# Patient Record
Sex: FEMALE | Race: WHITE | Employment: FULL TIME | URBAN - METROPOLITAN AREA
[De-identification: names, ages, dates, MRNs, and addresses within clinical notes are randomized per-mention and may not be internally consistent; named-entity substitution may affect disease eponyms.]

---

## 2019-11-22 LAB — PAP SMEAR, EXTERNAL: NORMAL

## 2020-01-08 LAB — COLONOSCOPY, EXTERNAL: NORMAL

## 2020-10-11 VITALS
OXYGEN SATURATION: 98 % | SYSTOLIC BLOOD PRESSURE: 112 MMHG | WEIGHT: 209 LBS | HEIGHT: 66 IN | DIASTOLIC BLOOD PRESSURE: 70 MMHG | TEMPERATURE: 98.8 F | RESPIRATION RATE: 16 BRPM | BODY MASS INDEX: 33.59 KG/M2 | HEART RATE: 73 BPM

## 2020-10-11 RX ORDER — VENLAFAXINE HYDROCHLORIDE 75 MG/1
TABLET, EXTENDED RELEASE ORAL DAILY
COMMUNITY
End: 2021-03-29 | Stop reason: ALTCHOICE

## 2020-10-11 RX ORDER — BUDESONIDE AND FORMOTEROL FUMARATE DIHYDRATE 80; 4.5 UG/1; UG/1
2 AEROSOL RESPIRATORY (INHALATION)
COMMUNITY
End: 2021-03-29

## 2021-03-29 ENCOUNTER — OFFICE VISIT (OUTPATIENT)
Dept: PRIMARY CARE CLINIC | Age: 61
End: 2021-03-29
Payer: COMMERCIAL

## 2021-03-29 ENCOUNTER — IMMUNIZATION (OUTPATIENT)
Dept: PRIMARY CARE CLINIC | Age: 61
End: 2021-03-29

## 2021-03-29 ENCOUNTER — TELEPHONE (OUTPATIENT)
Dept: PRIMARY CARE CLINIC | Age: 61
End: 2021-03-29

## 2021-03-29 VITALS
OXYGEN SATURATION: 99 % | TEMPERATURE: 97.9 F | RESPIRATION RATE: 16 BRPM | HEART RATE: 61 BPM | BODY MASS INDEX: 32.47 KG/M2 | HEIGHT: 66 IN | SYSTOLIC BLOOD PRESSURE: 153 MMHG | DIASTOLIC BLOOD PRESSURE: 80 MMHG | WEIGHT: 202 LBS

## 2021-03-29 DIAGNOSIS — Z00.00 ANNUAL PHYSICAL EXAM: Primary | ICD-10-CM

## 2021-03-29 DIAGNOSIS — Z13.220 LIPID SCREENING: ICD-10-CM

## 2021-03-29 DIAGNOSIS — Z11.59 ENCOUNTER FOR HEPATITIS C SCREENING TEST FOR LOW RISK PATIENT: ICD-10-CM

## 2021-03-29 DIAGNOSIS — I10 ESSENTIAL HYPERTENSION: Chronic | ICD-10-CM

## 2021-03-29 DIAGNOSIS — Z12.31 ENCOUNTER FOR SCREENING MAMMOGRAM FOR MALIGNANT NEOPLASM OF BREAST: ICD-10-CM

## 2021-03-29 DIAGNOSIS — F33.1 MODERATE EPISODE OF RECURRENT MAJOR DEPRESSIVE DISORDER (HCC): Chronic | ICD-10-CM

## 2021-03-29 DIAGNOSIS — Z13.31 POSITIVE DEPRESSION SCREENING: ICD-10-CM

## 2021-03-29 PROCEDURE — 99396 PREV VISIT EST AGE 40-64: CPT | Performed by: NURSE PRACTITIONER

## 2021-03-29 PROCEDURE — 99214 OFFICE O/P EST MOD 30 MIN: CPT | Performed by: NURSE PRACTITIONER

## 2021-03-29 RX ORDER — VALSARTAN 80 MG/1
80 TABLET ORAL DAILY
Qty: 90 TAB | Refills: 1 | Status: SHIPPED | OUTPATIENT
Start: 2021-03-29 | End: 2021-04-22 | Stop reason: SDUPTHER

## 2021-03-29 RX ORDER — DULOXETIN HYDROCHLORIDE 30 MG/1
60 CAPSULE, DELAYED RELEASE ORAL DAILY
Qty: 180 CAP | Refills: 1 | Status: SHIPPED | OUTPATIENT
Start: 2021-03-29 | End: 2021-04-22 | Stop reason: SDUPTHER

## 2021-03-29 NOTE — PROGRESS NOTES
HISTORY OF PRESENT ILLNESS  Ran Olson is a 57 y.o. female presents for   Chief Complaint   Patient presents with    Follow-up     Physical; generally good wants to be checked over and get labs    Depression was on depression meds but took herself off because she \"felt\" better  Also has left sided pain/numbness down to foot left side. COVID shots 1/27/2021 and 2/17/2021    Vitals:    03/29/21 0908   BP: (!) 153/80   BP 1 Location: Right arm   BP Patient Position: Sitting   Pulse: 61   Resp: 16   Temp: 97.9 °F (36.6 °C)   TempSrc: Temporal   SpO2: 99%   Weight: 202 lb (91.6 kg)   Height: 5' 6\" (1.676 m)      There is no problem list on file for this patient. There are no active problems to display for this patient. Allergies   Allergen Reactions    Oxycodone-Acetaminophen Itching     History reviewed. No pertinent past medical history. Past Surgical History:   Procedure Laterality Date    HX BILATERAL SALPINGECTOMY  1985    HX KNEE ARTHROSCOPY      HX TUBAL LIGATION Bilateral      Family History   Problem Relation Age of Onset    Arthritis-osteo Mother     Lung Disease Mother     Heart Disease Mother     Cancer Mother         colorectal    Diabetes Father     Stroke Father     Asthma Sister     Cancer Sister         breast     Social History     Tobacco Use    Smoking status: Never Smoker    Smokeless tobacco: Never Used   Substance Use Topics    Alcohol use: Not Currently           Review of Systems   Constitutional: Negative for fever and weight loss. HENT: Negative for sore throat and tinnitus. Eyes: Negative for blurred vision and double vision. Respiratory: Negative for shortness of breath. Cardiovascular: Negative for chest pain, palpitations and leg swelling. Gastrointestinal: Negative for constipation and diarrhea. Skin: Negative for itching and rash. Neurological: Negative for dizziness. Endo/Heme/Allergies: Does not bruise/bleed easily. Psychiatric/Behavioral: Positive for depression. The patient is nervous/anxious. The patient does not have insomnia. Physical Exam  Vitals signs reviewed. Constitutional:       Appearance: Normal appearance. She is overweight. HENT:      Head: Normocephalic. Nose: Nose normal.      Mouth/Throat:      Mouth: Mucous membranes are moist.   Eyes:      Extraocular Movements: Extraocular movements intact. Pupils: Pupils are equal, round, and reactive to light. Neck:      Musculoskeletal: Normal range of motion and neck supple. Cardiovascular:      Rate and Rhythm: Normal rate and regular rhythm. Pulses: Normal pulses. Heart sounds: Normal heart sounds. Pulmonary:      Effort: Pulmonary effort is normal.      Breath sounds: Normal breath sounds. Musculoskeletal: Normal range of motion. Skin:     General: Skin is warm and dry. Neurological:      General: No focal deficit present. Mental Status: She is alert and oriented to person, place, and time. Psychiatric:         Attention and Perception: Attention and perception normal.         Mood and Affect: Affect normal.         Speech: Speech normal.         Behavior: Behavior normal. Behavior is cooperative. Thought Content: Thought content normal.         Cognition and Memory: Cognition and memory normal.         Judgment: Judgment normal.           ASSESSMENT and PLAN  Diagnoses and all orders for this visit:    1. Annual physical exam  Comments:  generally healthy  Orders:  -     [facility] MAM[facility] BI SCREENING INCL CAD; Future  -     CBC WITH AUTOMATED DIFF  -     METABOLIC PANEL, COMPREHENSIVE  -     HEMOGLOBIN A1C WITH EAG  -     LIPID PANEL  -     HEPATITIS C AB    2. Encounter for hepatitis C screening test for low risk patient  Comments:  labs  Orders:  -     HEMOGLOBIN A1C WITH EAG    3.  Lipid screening  Comments:  labs  Orders:  -     LIPID PANEL    4. Encounter for screening mammogram for malignant neoplasm of breast  Comments:  yearly screen  Orders:  -     [facility] MAMMO BI SCREENING INCL CAD; Future    5. Moderate episode of recurrent major depressive disorder (HCC)  Comments:  returning to depression meds, also has left sided sciatica. Melissa  burning in feet  Orders:  -     DULoxetine (CYMBALTA) 30 mg capsule; Take 2 Caps by mouth daily. 6. Positive depression screening  Comments:  cymbalta  Orders:  -     DULoxetine (CYMBALTA) 30 mg capsule; Take 2 Caps by mouth daily. 7. Essential hypertension  Comments:  new diagnosis. start valsartan 3/29/21 will monitor for 2 x a week if 140/90 or more we will see sooner. is a pharmacy tech  Orders:  -     valsartan (DIOVAN) 80 mg tablet; Take 1 Tab by mouth daily. Niya Wood NP   Depression screen positive, PHQ 9 Score: 16, medication was prescribed, drug therapy education given - patient will call for any significant medication side effects or worsening symptoms of depression.

## 2021-03-29 NOTE — PROGRESS NOTES
1. Have you been to the ER, urgent care clinic since your last visit? Hospitalized since your last visit? No    2. Have you seen or consulted any other health care providers outside of the [address] [place] [address] since your last visit? Include any pap smears or colon screening.  No

## 2021-03-29 NOTE — TELEPHONE ENCOUNTER
Per [facility] ins will not cover Duloxetine 30 - 2 tablets daily.  Changed sig to 1 tablet daily per Marty Jarquin

## 2021-03-30 LAB
ALBUMIN SERPL-MCNC: 4.2 G/DL (ref 3.8–4.8)
ALBUMIN/GLOB SERPL: 1.8 {RATIO} (ref 1.2–2.2)
ALP SERPL-CCNC: 78 IU/L (ref 39–117)
ALT SERPL-CCNC: 17 IU/L (ref 0–32)
AST SERPL-CCNC: 15 IU/L (ref 0–40)
BASOPHILS # BLD AUTO: 0 X10E3/UL (ref 0–0.2)
BASOPHILS NFR BLD AUTO: 1 %
BILIRUB SERPL-MCNC: 0.2 MG/DL (ref 0–1.2)
BUN SERPL-MCNC: 11 MG/DL (ref 8–27)
BUN/CREAT SERPL: 20 (ref 12–28)
CALCIUM SERPL-MCNC: 9.7 MG/DL (ref 8.7–10.3)
CHLORIDE SERPL-SCNC: 106 MMOL/L (ref 96–106)
CHOLEST SERPL-MCNC: 184 MG/DL (ref 100–199)
CO2 SERPL-SCNC: 25 MMOL/L (ref 20–29)
CREAT SERPL-MCNC: 0.54 MG/DL (ref 0.57–1)
EOSINOPHIL # BLD AUTO: 0.3 X10E3/UL (ref 0–0.4)
EOSINOPHIL NFR BLD AUTO: 6 %
ERYTHROCYTE [DISTWIDTH] IN BLOOD BY AUTOMATED COUNT: 13.1 % (ref 11.7–15.4)
EST. AVERAGE GLUCOSE BLD GHB EST-MCNC: 114 MG/DL
GLOBULIN SER CALC-MCNC: 2.3 G/DL (ref 1.5–4.5)
GLUCOSE SERPL-MCNC: 88 MG/DL (ref 65–99)
HBA1C MFR BLD: 5.6 % (ref 4.8–5.6)
HCT VFR BLD AUTO: 41.2 % (ref 34–46.6)
HCV AB S/CO SERPL IA: <0.1 S/CO RATIO (ref 0–0.9)
HDLC SERPL-MCNC: 67 MG/DL
HGB BLD-MCNC: 13.1 G/DL (ref 11.1–15.9)
IMM GRANULOCYTES # BLD AUTO: 0 X10E3/UL (ref 0–0.1)
IMM GRANULOCYTES NFR BLD AUTO: 0 %
LDLC SERPL CALC-MCNC: 102 MG/DL (ref 0–99)
LYMPHOCYTES # BLD AUTO: 1.7 X10E3/UL (ref 0.7–3.1)
LYMPHOCYTES NFR BLD AUTO: 29 %
MCH RBC QN AUTO: 27.9 PG (ref 26.6–33)
MCHC RBC AUTO-ENTMCNC: 31.8 G/DL (ref 31.5–35.7)
MCV RBC AUTO: 88 FL (ref 79–97)
MONOCYTES # BLD AUTO: 0.4 X10E3/UL (ref 0.1–0.9)
MONOCYTES NFR BLD AUTO: 7 %
NEUTROPHILS # BLD AUTO: 3.3 X10E3/UL (ref 1.4–7)
NEUTROPHILS NFR BLD AUTO: 57 %
PLATELET # BLD AUTO: 307 X10E3/UL (ref 150–450)
POTASSIUM SERPL-SCNC: 4.4 MMOL/L (ref 3.5–5.2)
PROT SERPL-MCNC: 6.5 G/DL (ref 6–8.5)
RBC # BLD AUTO: 4.7 X10E6/UL (ref 3.77–5.28)
SODIUM SERPL-SCNC: 143 MMOL/L (ref 134–144)
TRIGL SERPL-MCNC: 80 MG/DL (ref 0–149)
VLDLC SERPL CALC-MCNC: 15 MG/DL (ref 5–40)
WBC # BLD AUTO: 5.7 X10E3/UL (ref 3.4–10.8)

## 2021-04-22 ENCOUNTER — TELEPHONE (OUTPATIENT)
Dept: PRIMARY CARE CLINIC | Age: 61
End: 2021-04-22

## 2021-04-22 DIAGNOSIS — Z13.31 POSITIVE DEPRESSION SCREENING: ICD-10-CM

## 2021-04-22 DIAGNOSIS — I10 ESSENTIAL HYPERTENSION: Chronic | ICD-10-CM

## 2021-04-22 DIAGNOSIS — F33.1 MODERATE EPISODE OF RECURRENT MAJOR DEPRESSIVE DISORDER (HCC): Chronic | ICD-10-CM

## 2021-04-22 RX ORDER — DULOXETIN HYDROCHLORIDE 60 MG/1
60 CAPSULE, DELAYED RELEASE ORAL DAILY
Qty: 90 CAP | Refills: 1 | Status: SHIPPED | OUTPATIENT
Start: 2021-04-22 | End: 2021-08-12 | Stop reason: SDUPTHER

## 2021-04-22 RX ORDER — VALSARTAN 160 MG/1
80 TABLET ORAL DAILY
Qty: 90 TAB | Refills: 0 | Status: SHIPPED | OUTPATIENT
Start: 2021-04-22 | End: 2021-04-29 | Stop reason: SDUPTHER

## 2021-04-22 NOTE — TELEPHONE ENCOUNTER
----- Message from Yamilet Medrano NP sent at 3/30/2021  9:55 AM EDT -----  Regarding: Check up on her

## 2021-04-22 NOTE — TELEPHONE ENCOUNTER
Called patient in follow up - states she really thinks she needs 60mgs of the Cymbalta - states she felt a little better for about a week when she started the 30 mgs. Also BP has been running 140-150 over 80.

## 2021-04-29 ENCOUNTER — TELEPHONE (OUTPATIENT)
Dept: PRIMARY CARE CLINIC | Age: 61
End: 2021-04-29

## 2021-04-29 DIAGNOSIS — I10 ESSENTIAL HYPERTENSION: Chronic | ICD-10-CM

## 2021-04-29 RX ORDER — VALSARTAN 160 MG/1
160 TABLET ORAL DAILY
Qty: 90 TAB | Refills: 0 | Status: SHIPPED | OUTPATIENT
Start: 2021-04-29 | End: 2021-08-09

## 2021-04-29 NOTE — TELEPHONE ENCOUNTER
Please call pt in regards to a mistake made on her medication she said the dosage of one of her meds were changed but not the quantity so she will only have 15 days of the med

## 2021-08-05 DIAGNOSIS — I10 ESSENTIAL HYPERTENSION: Chronic | ICD-10-CM

## 2021-08-09 RX ORDER — VALSARTAN 160 MG/1
TABLET ORAL
Qty: 30 TABLET | Refills: 0 | Status: SHIPPED | OUTPATIENT
Start: 2021-08-09 | End: 2021-08-12 | Stop reason: SDUPTHER

## 2021-08-12 ENCOUNTER — VIRTUAL VISIT (OUTPATIENT)
Dept: PRIMARY CARE CLINIC | Age: 61
End: 2021-08-12
Payer: COMMERCIAL

## 2021-08-12 VITALS — SYSTOLIC BLOOD PRESSURE: 134 MMHG | DIASTOLIC BLOOD PRESSURE: 86 MMHG

## 2021-08-12 DIAGNOSIS — G62.9 NEUROPATHY: ICD-10-CM

## 2021-08-12 DIAGNOSIS — F33.1 MODERATE EPISODE OF RECURRENT MAJOR DEPRESSIVE DISORDER (HCC): Chronic | ICD-10-CM

## 2021-08-12 DIAGNOSIS — I10 ESSENTIAL HYPERTENSION: Chronic | ICD-10-CM

## 2021-08-12 PROCEDURE — 99214 OFFICE O/P EST MOD 30 MIN: CPT | Performed by: NURSE PRACTITIONER

## 2021-08-12 RX ORDER — DULOXETIN HYDROCHLORIDE 60 MG/1
60 CAPSULE, DELAYED RELEASE ORAL DAILY
Qty: 90 CAPSULE | Refills: 1 | Status: SHIPPED | OUTPATIENT
Start: 2021-08-12

## 2021-08-12 RX ORDER — VALSARTAN 160 MG/1
TABLET ORAL
Qty: 90 TABLET | Refills: 1 | Status: SHIPPED | OUTPATIENT
Start: 2021-08-12

## 2021-08-12 NOTE — PROGRESS NOTES
1. Have you been to the ER, urgent care clinic since your last visit? Hospitalized since your last visit? No    2. Have you seen or consulted any other health care providers outside of the [address] [place] [address] since your last visit? Include any pap smears or colon screening.  No

## 2021-08-12 NOTE — PROGRESS NOTES
ASSESSMENT and PLAN  Diagnoses and all orders for this visit:    1. Essential hypertension  Comments:  new diagnosis. start valsartan 3/29/21 will monitor for 2 x a week if 140/90 or more we will see sooner. is a pharmacy tech  Assessment & Plan:   well controlled, continue current medications, continue current treatment plan      2. Moderate episode of recurrent major depressive disorder (HCC)  Comments:  returning to depression meds, also has left sided sciatica. Denice Daquan burning in feet  Assessment & Plan:   well controlled, continue current medications pending work up below      3. Neuropathy  Assessment & Plan:   well controlled, continue current medications             Chayito Groves is a 57 y.o. female presents via telemedicine for     Here for a follow up on Hypertension      [x]Bloodpressure well controlled at home and monitors with home device  [x]Denies Headache, Chest pain, Visual changes, Dizziness, and difficulty breathing  [x]Is taking current home meds as prescribed    Lab Results   Component Value Date/Time    Creatinine 0.54 (L) 03/29/2021 10:07 AM   ]  Lab Results   Component Value Date/Time    GFR est  03/29/2021 10:07 AM   ]  Lab Results   Component Value Date/Time    GFR est non- 03/29/2021 10:07 AM   ]    Takes: [ARBs] [x]Valsartan; []Losartan; [] Olmesartan;[] Irbesartan  [ACE inhibitors] []Lisinopril; []Enalapril; []Ramipril; []Benazapril  [CCB] []Amlodipine []Cardizem/Diltiazem  [Diuretics] []HCTZ; []Chlorthalidone; []Lasix; []Indapamide;  [B blockers][]Atenolol; []Metoprolol; []Propanolol; []Bystolic (nebivolol); []Carvediolol;    [other] []Clonidine tabs scheduled []Clonidine Tabs PRN []Clonidine patches  [] other [as listed on meds]        Follow-up on mixed anxiety and depression    Currently patient is on    [x]Cymbalta  []Zoloft  []Prozac  []Lexapro  []Citalopram  []Venlafaxine  []Wellbutrin  []Amitriptyline []Abilify  []Viibryd  []Trintellix  []Latuda  []Vraylar  []Seroquel  []Trazodone  [] Xanax       States current regimen is working well no changes in depression or anxiety status no change in sleep patterns no change in appetite patterns no side effects expressed by the patient other than    []Weight gain  []Sexual dysfunction  []Weight loss    Follow up on bilat neuropathy of feet treated with cymbalta and doing much better on regimen   There were no vitals filed for this visit. Patient Active Problem List   Diagnosis Code    Neuropathy G62.9    Moderate episode of recurrent major depressive disorder ([address] F33.1    Essential hypertension I10     Patient Active Problem List    Diagnosis Date Noted    Neuropathy 08/12/2021    Moderate episode of recurrent major depressive disorder ([facility] 08/12/2021    Essential hypertension 08/12/2021     Current Outpatient Medications   Medication Sig Dispense Refill    valsartan (DIOVAN) 160 mg tablet TAKE 1 TABLET BY MOUTH EVERY DAY 30 Tablet 0    DULoxetine (CYMBALTA) 60 mg capsule Take 1 Cap by mouth daily. 90 Cap 1     Allergies   Allergen Reactions    Oxycodone-Acetaminophen Itching     History reviewed. No pertinent past medical history. Past Surgical History:   Procedure Laterality Date    HX BILATERAL SALPINGECTOMY  1985    HX KNEE ARTHROSCOPY      HX TUBAL LIGATION Bilateral      Family History   Problem Relation Age of Onset    Arthritis-osteo Mother     Lung Disease Mother     Heart Disease Mother     Cancer Mother         colorectal    Diabetes Father     Stroke Father     Asthma Sister     Cancer Sister         breast     Social History     Tobacco Use    Smoking status: Never Smoker    Smokeless tobacco: Never Used   Substance Use Topics    Alcohol use: Not Currently           Review of Systems   Constitutional: Negative for fever. Respiratory: Negative for cough and shortness of breath.     Cardiovascular: Negative for chest pain and palpitations. Gastrointestinal: Negative for constipation and diarrhea. Neurological: Negative for dizziness. Psychiatric/Behavioral: Negative for depression. The patient is not nervous/anxious and does not have insomnia. Physical Exam  Constitutional:       Appearance: Normal appearance. Comments: As seen on video chat   HENT:      Head: Normocephalic and atraumatic. Comments: As seen on video chat     Nose: Nose normal.      Comments: As seen on video chat  Eyes:      Extraocular Movements: Extraocular movements intact. Comments: As seen on video chat   Neck:      Comments: As seen on video chat  Pulmonary:      Effort: Pulmonary effort is normal.   Musculoskeletal:      Cervical back: Normal range of motion. Neurological:      General: No focal deficit present. Mental Status: She is alert and oriented to person, place, and time. Comments: As seen on video chat   Psychiatric:         Mood and Affect: Mood normal.         Behavior: Behavior normal.         Thought Content: Thought content normal.         Judgment: Judgment normal.           Holland Cortez Destinygennaro who was evaluated through a synchronous (real-time) audio-video encounter, and/or his healthcare decision maker, is aware that it is a billable service, with coverage as determined by his insurance carrier. He provided verbal consent to proceed: Yes, and patient identification was verified. It was conducted pursuant to the emergency declaration under the [address] [place] [address] authority and the [employer] Act. A caregiver was present when appropriate. Ability to conduct physical exam was limited. I was at home. The patient was at home. Jonathan Giles NP       This office note has been dictated using voice capture. Despite evaluating for errors, syntax errors could be present.

## 2022-03-18 PROBLEM — I10 ESSENTIAL HYPERTENSION: Status: ACTIVE | Noted: 2021-08-12

## 2022-03-19 PROBLEM — F33.1 MODERATE EPISODE OF RECURRENT MAJOR DEPRESSIVE DISORDER (HCC): Status: ACTIVE | Noted: 2021-08-12

## 2022-03-20 PROBLEM — G62.9 NEUROPATHY: Status: ACTIVE | Noted: 2021-08-12

## 2023-05-18 RX ORDER — VALSARTAN 160 MG/1
1 TABLET ORAL DAILY
COMMUNITY
Start: 2021-08-12

## 2023-05-18 RX ORDER — DULOXETIN HYDROCHLORIDE 60 MG/1
60 CAPSULE, DELAYED RELEASE ORAL DAILY
COMMUNITY
Start: 2021-08-12